# Patient Record
Sex: MALE | Race: WHITE | NOT HISPANIC OR LATINO | Employment: FULL TIME | URBAN - NONMETROPOLITAN AREA
[De-identification: names, ages, dates, MRNs, and addresses within clinical notes are randomized per-mention and may not be internally consistent; named-entity substitution may affect disease eponyms.]

---

## 2020-02-26 ENCOUNTER — HOSPITAL ENCOUNTER (OUTPATIENT)
Dept: ULTRASOUND IMAGING | Facility: HOSPITAL | Age: 70
Discharge: HOME | End: 2020-02-26
Payer: MEDICARE

## 2020-02-26 ENCOUNTER — TRANSCRIBE ORDERS (OUTPATIENT)
Dept: ADMINISTRATIVE | Facility: HOSPITAL | Age: 70
End: 2020-02-26

## 2020-02-26 ENCOUNTER — APPOINTMENT (OUTPATIENT)
Dept: LAB | Facility: HOSPITAL | Age: 70
End: 2020-02-26
Payer: MEDICARE

## 2020-02-26 DIAGNOSIS — E78.5 HYPERLIPIDEMIA, UNSPECIFIED HYPERLIPIDEMIA TYPE: Primary | ICD-10-CM

## 2020-02-26 DIAGNOSIS — Z79.899 ENCOUNTER FOR LONG-TERM (CURRENT) USE OF OTHER MEDICATIONS: ICD-10-CM

## 2020-02-26 DIAGNOSIS — E04.1 NONTOXIC UNINODULAR GOITER: Primary | ICD-10-CM

## 2020-02-26 DIAGNOSIS — E78.5 HYPERLIPIDEMIA, UNSPECIFIED HYPERLIPIDEMIA TYPE: ICD-10-CM

## 2020-02-26 DIAGNOSIS — E04.1 NONTOXIC UNINODULAR GOITER: ICD-10-CM

## 2020-02-26 LAB
ALBUMIN SERPL BCP-MCNC: 3.7 G/DL (ref 3.5–5)
ALP SERPL-CCNC: 42 U/L (ref 46–116)
ALT SERPL W P-5'-P-CCNC: 20 U/L (ref 12–78)
ANION GAP SERPL CALCULATED.3IONS-SCNC: 8 MMOL/L (ref 4–13)
AST SERPL W P-5'-P-CCNC: 19 U/L (ref 5–45)
BILIRUB SERPL-MCNC: 0.9 MG/DL (ref 0.2–1)
BUN SERPL-MCNC: 20 MG/DL (ref 5–25)
CALCIUM SERPL-MCNC: 8.1 MG/DL (ref 8.3–10.1)
CHLORIDE SERPL-SCNC: 105 MMOL/L (ref 100–108)
CHOLEST SERPL-MCNC: 136 MG/DL (ref 50–200)
CO2 SERPL-SCNC: 27 MMOL/L (ref 21–32)
CREAT SERPL-MCNC: 0.93 MG/DL (ref 0.6–1.3)
ERYTHROCYTE [DISTWIDTH] IN BLOOD BY AUTOMATED COUNT: 13.2 % (ref 11.6–15.1)
EST. AVERAGE GLUCOSE BLD GHB EST-MCNC: 103 MG/DL
GFR SERPL CREATININE-BSD FRML MDRD: 83 ML/MIN/1.73SQ M
GLUCOSE P FAST SERPL-MCNC: 99 MG/DL (ref 65–99)
HBA1C MFR BLD: 5.2 %
HCT VFR BLD AUTO: 43.7 % (ref 36.5–49.3)
HDLC SERPL-MCNC: 57 MG/DL
HGB BLD-MCNC: 14.2 G/DL (ref 12–17)
LDLC SERPL CALC-MCNC: 67 MG/DL (ref 0–100)
MCH RBC QN AUTO: 29.3 PG (ref 26.8–34.3)
MCHC RBC AUTO-ENTMCNC: 32.5 G/DL (ref 31.4–37.4)
MCV RBC AUTO: 90 FL (ref 82–98)
NONHDLC SERPL-MCNC: 79 MG/DL
PLATELET # BLD AUTO: 225 THOUSANDS/UL (ref 149–390)
PMV BLD AUTO: 10.3 FL (ref 8.9–12.7)
POTASSIUM SERPL-SCNC: 4 MMOL/L (ref 3.5–5.3)
PROT SERPL-MCNC: 7 G/DL (ref 6.4–8.2)
PSA SERPL-MCNC: 1.3 NG/ML (ref 0–4)
RBC # BLD AUTO: 4.84 MILLION/UL (ref 3.88–5.62)
SODIUM SERPL-SCNC: 140 MMOL/L (ref 136–145)
T4 FREE SERPL-MCNC: 1.14 NG/DL (ref 0.76–1.46)
TRIGL SERPL-MCNC: 61 MG/DL
TSH SERPL DL<=0.05 MIU/L-ACNC: 2.83 UIU/ML (ref 0.36–3.74)
WBC # BLD AUTO: 6.92 THOUSAND/UL (ref 4.31–10.16)

## 2020-02-26 PROCEDURE — 84439 ASSAY OF FREE THYROXINE: CPT

## 2020-02-26 PROCEDURE — 76536 US EXAM OF HEAD AND NECK: CPT

## 2020-02-26 PROCEDURE — 80053 COMPREHEN METABOLIC PANEL: CPT

## 2020-02-26 PROCEDURE — 80061 LIPID PANEL: CPT

## 2020-02-26 PROCEDURE — 84443 ASSAY THYROID STIM HORMONE: CPT

## 2020-02-26 PROCEDURE — G0103 PSA SCREENING: HCPCS

## 2020-02-26 PROCEDURE — 83036 HEMOGLOBIN GLYCOSYLATED A1C: CPT

## 2020-02-26 PROCEDURE — 85027 COMPLETE CBC AUTOMATED: CPT

## 2020-02-26 PROCEDURE — 36415 COLL VENOUS BLD VENIPUNCTURE: CPT

## 2020-07-17 ENCOUNTER — TRANSCRIBE ORDERS (OUTPATIENT)
Dept: ADMINISTRATIVE | Facility: HOSPITAL | Age: 70
End: 2020-07-17

## 2020-07-17 DIAGNOSIS — R51.9 FACIAL PAIN: ICD-10-CM

## 2020-07-17 DIAGNOSIS — R42 DIZZINESS AND GIDDINESS: Primary | ICD-10-CM

## 2020-07-23 ENCOUNTER — HOSPITAL ENCOUNTER (OUTPATIENT)
Dept: CT IMAGING | Facility: HOSPITAL | Age: 70
Discharge: HOME | End: 2020-07-23
Payer: MEDICARE

## 2020-07-23 DIAGNOSIS — R51.9 FACIAL PAIN: ICD-10-CM

## 2020-07-23 DIAGNOSIS — R42 DIZZINESS AND GIDDINESS: ICD-10-CM

## 2020-07-23 PROCEDURE — 70470 CT HEAD/BRAIN W/O & W/DYE: CPT

## 2020-07-23 RX ADMIN — IOHEXOL 85 ML: 350 INJECTION, SOLUTION INTRAVENOUS at 08:29

## 2020-08-04 ENCOUNTER — TRANSCRIBE ORDERS (OUTPATIENT)
Dept: PHYSICAL THERAPY | Facility: CLINIC | Age: 70
End: 2020-08-04

## 2020-08-04 ENCOUNTER — EVALUATION (OUTPATIENT)
Dept: PHYSICAL THERAPY | Facility: CLINIC | Age: 70
End: 2020-08-04
Payer: MEDICARE

## 2020-08-04 DIAGNOSIS — H81.10 BENIGN PAROXYSMAL POSITIONAL VERTIGO, UNSPECIFIED LATERALITY: ICD-10-CM

## 2020-08-04 DIAGNOSIS — R42 VERTIGO: Primary | ICD-10-CM

## 2020-08-04 PROCEDURE — 97535 SELF CARE MNGMENT TRAINING: CPT | Performed by: PHYSICAL THERAPIST

## 2020-08-04 PROCEDURE — 97162 PT EVAL MOD COMPLEX 30 MIN: CPT | Performed by: PHYSICAL THERAPIST

## 2020-08-04 PROCEDURE — 97140 MANUAL THERAPY 1/> REGIONS: CPT | Performed by: PHYSICAL THERAPIST

## 2020-08-04 NOTE — LETTER
2020  PT Evaluation Plan of [address]  [address] [facility]  [address] [address]    Patient: Nilda Ramos   YOB: 1950   Date of Visit: 2020     Encounter Diagnosis     ICD-10-CM    1  Vertigo  R42    2  Benign paroxysmal positional vertigo, unspecified laterality  H81 10      Dear Dr Gamal Kemp:  Thank you for your recent referral of Nilda Ramos  Please review the attached evaluation summary from Bryce's recent visit  Please verify that you agree with the plan of care by signing the attached order  If you have any questions or concerns, please do not hesitate to call  I sincerely appreciate the opportunity to share in the care of one of your patients and hope to have another opportunity to work with you in the near future  Sincerely,    Roel Valdez, PT    Referring Provider:      I certify that I have read the below Plan of Care and certify the need for these services furnished under this plan of treatment while under my care  Jacinto Maguire, [address] [facility]  [address] [address] [address] [facility]: [phone]    Please SIGN ABOVE and return THIS PAGE ONLY to Fax # [phone]        PT Evaluation   Today's date: 2020  Patient name: Nilda Ramos  : 1950  MRN: [id number]  Referring provider: Mercy Shaffer MD  Dx: No diagnosis found  Assessment  Assessment details: Patient's chief Complaint was early morning dizziness when firs getting out of bed  Patient reports he has been feeling better over the past week  He has minimal balance or dizziness issues today  Impairments: activity intolerance, impaired balance and safety issue  Understanding of Dx/Px/POC: excellent  Goals  STG 2-4 weeks:   Increase gait speed   5 ft/sec  Increase B LE strength by 3-8 lbs  Increase standing and walking tolerance to >20 minutes     Patient to navigate 5 steps / stairs with unilateral railing  Reduce C/O dizziness  Decrease pain by 25-50% with standing, walking, and stairs  Increase ROM by 2-5 degrees  Initiate HEP  LTG 8-10 weeks:   Increase gait speed 1 0 ft/sec  Increase B LE strength 15-20 lbs  Increase standing and walking tolerances and distances  Increase static supine flexion to normal    No C/O dizziness  Improve gait pattern and balance  No C/O falls  DC with HEP  Plan  Plan details: All planned modality interventions and planned therapy interventions are provided PRN  Patient would benefit from: PT eval and skilled physical therapy  Planned therapy interventions: neuromuscular re-education, patient education, postural training, self care, strengthening, stretching, therapeutic activities, therapeutic exercise, therapeutic training, transfer training, home exercise program, graded exercise, gait training, flexibility, coordination, balance, balance/weight bearing training and canalith repositioning  Frequency: 3x week  Duration in weeks: 12  Treatment plan discussed with: patient      Subjective Evaluation    Pain  Relieving factors: change in position, relaxation and rest  Aggravating factors: overhead activity and nothing  Progression: improved    Treatments  Current treatment: physical therapy  Patient Goals  Patient goals for therapy: improved balance, increased motion, return to work, return to [employer] activities, independence with ADLs/IADLs and increased strength      Date of onset:  7/10/2020    Date of Surgery:  None    History of Present Episode: 8/4/2020  Marlin Guerrero states no history of accident or injury  Patient states that starting around three week ago he would get up in the morning and he would feel dizzy or unbalanced  Past Medical History:    8/4/2020  Marlin Guerrero reports no history  Previous Level of Functional Ability:  8/4/2020  Marlin Guerrero states he has never had issues with his balance or feeling dizzy in the morning      Inspection / Palpation:  8/4/2020  Mesomorphic body type  No signs of infection  No signs of wounds  No signs of drainage  No signs of ecchymotic regions  No signs of erythremic regions  No signs of muscle spasm  No signs of muscle guarding  No signs of tenderness reported to palpation  No signs of swelling  No signs of a surgery site  Current conditions appears consistent with recent acute episode  Chief Complaints:  8/4/2020  Pilar Adele reports no difficulty with standing  Pilar Adele reports no difficulty with walking  Pilar Adele reports no difficulty with movement / use of his neck region  Pilar Adele reports no difficulty with use of his arms  Pilar Adele reports no difficulty with sleeping  Pilar Adele reports no difficulty with his strength and endurance  Pilar Adele reports no limitations with his neck range of motion  Pilar Adele reports no difficulty lying on his neck region  Pilar Adele reports no difficulty twisting / turning his neck region  Pilar Adele reports mild issues with his balance  Pilar Adele reports mild symptoms with his dizziness  Only occurs when he first gets out of bed  He is good the rest of the day  Janak Advanced Balance Scale (CARLITA): Normal = 40   Date A B C D E F G H I J Total   8/4/2020 4 4 4 4 4 4 4 3 2 4      A )  (0, 1, 2, 3, 4)   Standing with feet together, eyes closed  B )  (0, 1, 2, 3, 4)  Reaching forward to an object (pencil) held at shoulder height with outstretched arm    C )   (0, 1, 2, 3, 4)   Turn 360°  D )  (0, 1, 2, 3, 4)  Tandem walk  E )  (0, 1, 2, 3, 4)  Standing on foam with eyes closed  F )  (0, 1, 2, 3, 4)  Walk with head turned  G )  (0, 1, 2, 3, 4)  Step up and over a 6   H )  (0, 1, 2, 3, 4)  Standing on one leg   I )  (0, 1, 2, 3, 4)  Two-footed jump for distance  J )  (0, 1, 2, 3, 4)  Reactive postural control      NECK PAIN  Resting Palpation Bending  Forward Rotate  Right Rotate  Left   8/4/2020 0 0 0 0 0     NECK PAIN Driving Sleeping Moving Extending Overhead 8/4/2020 0 0 0 0 0     NECK AROM Flexion Extension Rotation Right   8/4/2020 42° 45° 85°     NECK AROM Rotation Left Side Bend Right Side Bend Left   8/4/2020 86° 31° 32°     Neck Screen Compression Distraction Slump Test Epting / Vertigo   8/4/2020 Rt Negative Negative Negative Negative   8/4/2020 Lt Negative Negative Negative Negative     Neck Screen Swallowing Valsalva Adson TMJ   8/4/2020 Rt Negative Negative Negative Negative   8/4/2020 Lt Negative Negative Negative Negative     Neck Screen Referred Pain Thoracic outlet Crepitus   8/4/2020 Rt Negative Negative Negative   8/4/2020 Lt Negative Negative Negative     Precautions:  Vertigo / Early Morning Dizziness    All treatments below will be provided with a focus on neural issues involving balance,   coordination and proprioception plus potential numbness and tingling issues without   ignoring strengthening, flexibility, ROM  Postural, endurance and any possible swelling  and pain which may be present which is also important and necessary to provide full   functional mobility and quality care        Daily Treatment Log  Manual  8/4       MT  ROM 15'       HEP 15'       Neur ExerLog 8/4       Balance Board        P-Bars - Chair squat        P-Bars-GT Forward/Backward/Side - level and dips - eyes open        P-Bars-GT Forward/Backward/Side - level and dips - eyes closed        BOSU-Walk        Foam Pad        Foam Beam        GT - Gym        Fitter-Slalom        Monster Steps        Wall sits        Lunges        Walking with head turned        Walking with ball toss         W/P- Hip Abd/Add/Fl/Ex        T/G-Squats/PF        NuStep        NK Table        TM        Bike        Stepper                Modalities  8/4

## 2020-08-04 NOTE — PROGRESS NOTES
PT Evaluation   Today's date: 2020  Patient name: Inge Ramos  : 1950  MRN: [id number]  Referring provider: Alejandrina Bee MD  Dx: No diagnosis found  Assessment  Assessment details: Patient's chief Complaint was early morning dizziness when firs getting out of bed  Patient reports he has been feeling better over the past week  He has minimal balance or dizziness issues today  Impairments: activity intolerance, impaired balance and safety issue  Understanding of Dx/Px/POC: excellent  Goals  STG 2-4 weeks:   Increase gait speed   5 ft/sec  Increase B LE strength by 3-8 lbs  Increase standing and walking tolerance to >20 minutes  Patient to navigate 5 steps / stairs with unilateral railing  Reduce C/O dizziness  Decrease pain by 25-50% with standing, walking, and stairs  Increase ROM by 2-5 degrees  Initiate HEP  LTG 8-10 weeks:   Increase gait speed 1 0 ft/sec  Increase B LE strength 15-20 lbs  Increase standing and walking tolerances and distances  Increase static supine flexion to normal    No C/O dizziness  Improve gait pattern and balance  No C/O falls  DC with HEP  Plan  Plan details: All planned modality interventions and planned therapy interventions are provided PRN    Patient would benefit from: PT eval and skilled physical therapy  Planned therapy interventions: neuromuscular re-education, patient education, postural training, self care, strengthening, stretching, therapeutic activities, therapeutic exercise, therapeutic training, transfer training, home exercise program, graded exercise, gait training, flexibility, coordination, balance, balance/weight bearing training and canalith repositioning  Frequency: 3x week  Duration in weeks: 12  Treatment plan discussed with: patient      Subjective Evaluation    Pain  Relieving factors: change in position, relaxation and rest  Aggravating factors: overhead activity and nothing  Progression: improved    Treatments  Current treatment: physical therapy  Patient Goals  Patient goals for therapy: improved balance, increased motion, return to work, return to [employer] activities, independence with ADLs/IADLs and increased strength      Date of onset:  7/10/2020    Date of Surgery:  None    History of Present Episode: 8/4/2020  Dalila Arroyo states no history of accident or injury  Patient states that starting around three week ago he would get up in the morning and he would feel dizzy or unbalanced  Past Medical History:    8/4/2020  Dalila Arroyo reports no history  Previous Level of Functional Ability:  8/4/2020  Dalila Arroyo states he has never had issues with his balance or feeling dizzy in the morning  Inspection / Palpation:  8/4/2020  Mesomorphic body type  No signs of infection  No signs of wounds  No signs of drainage  No signs of ecchymotic regions  No signs of erythremic regions  No signs of muscle spasm  No signs of muscle guarding  No signs of tenderness reported to palpation  No signs of swelling  No signs of a surgery site  Current conditions appears consistent with recent acute episode  Chief Complaints:  8/4/2020  Dalila Arroyo reports no difficulty with standing  Dalila Arroyo reports no difficulty with walking  Dalila Arroyo reports no difficulty with movement / use of his neck region  Maxjuan daniel Arroyo reports no difficulty with use of his arms  Maxjuan daniel Arroyo reports no difficulty with sleeping  Dalila Arroyo reports no difficulty with his strength and endurance  Dalila Arroyo reports no limitations with his neck range of motion  Dalila Arroyo reports no difficulty lying on his neck region  Dalila Arroyo reports no difficulty twisting / turning his neck region  Dalila Arroyo reports mild issues with his balance  Dalila Arroyo reports mild symptoms with his dizziness  Only occurs when he first gets out of bed  He is good the rest of the day      Janak Advanced Balance Scale (CARLITA): Normal = 40   Date A B C D E F G H I J Total 8/4/2020 4 4 4 4 4 4 4 3 2 4      A )  (0, 1, 2, 3, 4)   Standing with feet together, eyes closed  B )  (0, 1, 2, 3, 4)  Reaching forward to an object (pencil) held at shoulder height with outstretched arm    C )   (0, 1, 2, 3, 4)   Turn 360°  D )  (0, 1, 2, 3, 4)  Tandem walk  E )  (0, 1, 2, 3, 4)  Standing on foam with eyes closed  F )  (0, 1, 2, 3, 4)  Walk with head turned  G )  (0, 1, 2, 3, 4)  Step up and over a 6   H )  (0, 1, 2, 3, 4)  Standing on one leg   I )  (0, 1, 2, 3, 4)  Two-footed jump for distance  J )  (0, 1, 2, 3, 4)  Reactive postural control  NECK PAIN  Resting Palpation Bending  Forward Rotate  Right Rotate  Left   8/4/2020 0 0 0 0 0     NECK PAIN Driving Sleeping Moving Extending Overhead   8/4/2020 0 0 0 0 0     NECK AROM Flexion Extension Rotation Right   8/4/2020 42° 45° 85°     NECK AROM Rotation Left Side Bend Right Side Bend Left   8/4/2020 86° 31° 32°     Neck Screen Compression Distraction Slump Test Epting / Vertigo   8/4/2020 Rt Negative Negative Negative Negative   8/4/2020 Lt Negative Negative Negative Negative     Neck Screen Swallowing Valsalva Adson TMJ   8/4/2020 Rt Negative Negative Negative Negative   8/4/2020 Lt Negative Negative Negative Negative     Neck Screen Referred Pain Thoracic outlet Crepitus   8/4/2020 Rt Negative Negative Negative   8/4/2020 Lt Negative Negative Negative     Precautions:  Vertigo / Early Morning Dizziness    All treatments below will be provided with a focus on neural issues involving balance,   coordination and proprioception plus potential numbness and tingling issues without   ignoring strengthening, flexibility, ROM  Postural, endurance and any possible swelling  and pain which may be present which is also important and necessary to provide full   functional mobility and quality care        Daily Treatment Log  Manual  8/4       MT  ROM 15'       HEP 15'       Neur ExerLog 8/4       Balance Board        P-Bars - Chair squat P-Bars-GT Forward/Backward/Side - level and dips - eyes open        P-Bars-GT Forward/Backward/Side - level and dips - eyes closed        BOSU-Walk        Foam Pad        Foam Beam        GT - Gym        Fitter-Slalom        Monster Steps        Wall sits        Lunges        Walking with head turned        Walking with ball toss         W/P- Hip Abd/Add/Fl/Ex        T/G-Squats/PF        NuStep        NK Table        TM        Bike        Stepper                Modalities  8/4

## 2020-09-09 NOTE — PROGRESS NOTES
PT Discharge  Today's date: 2020  Patient name: Montse Ramos  : 1950  MRN: [id number]  Referring provider: Khoa Moraes MD  Dx:   Encounter Diagnosis     ICD-10-CM    1  Vertigo  R42    2  Benign paroxysmal positional vertigo, unspecified laterality  H81 10      2020  Carlos Ramos has not returned for more treatment  Montse Ramos did not attend today's appointment  I cannot provide you with a current progress report but I can provide you with information based on previous performance  Montse Ramos is discharged at this time

## 2021-03-09 DIAGNOSIS — Z23 ENCOUNTER FOR IMMUNIZATION: ICD-10-CM

## 2022-04-25 ENCOUNTER — OFFICE VISIT (OUTPATIENT)
Dept: URGENT CARE | Facility: CLINIC | Age: 72
End: 2022-04-25
Payer: MEDICARE

## 2022-04-25 VITALS
SYSTOLIC BLOOD PRESSURE: 129 MMHG | BODY MASS INDEX: 30.3 KG/M2 | WEIGHT: 171 LBS | HEIGHT: 63 IN | DIASTOLIC BLOOD PRESSURE: 67 MMHG | OXYGEN SATURATION: 98 % | HEART RATE: 61 BPM | RESPIRATION RATE: 20 BRPM | TEMPERATURE: 97.5 F

## 2022-04-25 DIAGNOSIS — G57.61 MORTON'S NEUROMA OF RIGHT FOOT: ICD-10-CM

## 2022-04-25 DIAGNOSIS — L03.031 PARONYCHIA OF THIRD TOE OF RIGHT FOOT: Primary | ICD-10-CM

## 2022-04-25 PROCEDURE — 99203 OFFICE O/P NEW LOW 30 MIN: CPT | Performed by: EMERGENCY MEDICINE

## 2022-04-25 PROCEDURE — G0463 HOSPITAL OUTPT CLINIC VISIT: HCPCS | Performed by: EMERGENCY MEDICINE

## 2022-04-25 RX ORDER — ATORVASTATIN CALCIUM 10 MG/1
TABLET, FILM COATED ORAL
COMMUNITY
Start: 2022-04-14

## 2022-04-25 RX ORDER — DOXYCYCLINE 100 MG/1
100 TABLET ORAL 2 TIMES DAILY
Qty: 14 TABLET | Refills: 0 | Status: SHIPPED | OUTPATIENT
Start: 2022-04-25 | End: 2022-05-02

## 2022-04-25 NOTE — PROGRESS NOTES
[address] [facility]        NAME: Gulshan Ramos is a 70 y o  male  : 1950    MRN: [id number]  DATE: 2022  TIME: 9:53 AM    Assessment and Plan   Paronychia of third toe of right foot [[id number]]  1  Paronychia of third toe of right foot  Ambulatory Referral to Podiatry    doxycycline (ADOXA) 100 MG tablet   2  Vargas's neuroma of right foot  Ambulatory Referral to Podiatry    doxycycline (ADOXA) 100 MG tablet         Patient Instructions     Patient Instructions     Paronychia   WHAT YOU NEED TO KNOW:   Paronychia is an infection of your nail fold caused by bacteria or a fungus  The nail fold is the skin around your nail  Paronychia may happen suddenly and last for 6 weeks or longer  You may have paronychia on more than 1 finger or toe  DISCHARGE INSTRUCTIONS:   Medicines:   · Td vaccine  is a booster shot used to help prevent tetanus and diphtheria  The Td booster may be given to adolescents and adults every 10 years or for certain wounds and injuries  · Antibiotics: This medicine will help fight or prevent an infection  It may be given as a pill, cream, or ointment  · Steroids: This medicine will help decrease inflammation  It may be given as a pill, cream, or ointment  · Antifungal medicine: This medicine helps kill fungus that may be causing your infection  It may be given as a cream or ointment  · NSAIDs:  These medicines decrease pain and swelling  NSAIDs are available without a doctor's order  Ask your healthcare provider which medicine is right for you  Ask how much to take and when to take it  Take as directed  NSAIDs can cause stomach bleeding and kidney problems if not taken correctly  · Take your medicine as directed  Contact your healthcare provider if you think your medicine is not helping or if you have side effects  Tell him of her if you are allergic to any medicine  Keep a list of the medicines, vitamins, and herbs you take   Include the amounts, and when and why you take them  Bring the list or the pill bottles to follow-up visits  Carry your medicine list with you in case of an emergency  Follow up with your doctor as directed:  Write down your questions so you remember to ask them during your visits  Self-care:   · Soak your nail:  Soak your nail in a mixture of equal parts vinegar and water 3 or 4 times each day  This will help decrease inflammation  · Apply a warm compress:  Soak a washcloth in warm water and place it on your nail  This will help decrease inflammation  · Elevate:  Raise your nail above the level of your heart as often as you can  This will help decrease swelling and pain  Prop your nail on pillows or blankets to keep it elevated comfortably  · Use lotion:  Apply lotion after you wash your hands  This will prevent your skin from becoming too dry  Prevent paronychia:   · Avoid chemicals and allergens that may harm your skin and nails  This includes soaps, laundry detergents, and nail products  · Keep your nails clean and dry  Avoid soaking your nails in water  Use cotton-lined rubber gloves or wear 2 rubber gloves if you work with food or water  The gloves will help protect your nail folds  · Keep your nails short  Do not bite your nails, pick at your hangnails, suck your fingers, or wear fake nails  Bring your own nail tools when you go to the nail salon  Contact your healthcare provider if:   · Your nail becomes loose, deformed, or falls off  · You have a large abscess on your nail  · You have questions or concerns about your condition or care  Return to the emergency department if:   · You have severe nail pain  · The inflammation spreads to your hand or arm  © Copyright [employer] 2022 Information is for End User's use only and may not be sold, redistributed or otherwise used for commercial purposes   All illustrations and images included in CareNotes® are the copyrighted property of [employer]  or [address]   The above information is an  only  It is not intended as medical advice for individual conditions or treatments  Talk to your doctor, nurse or pharmacist before following any medical regimen to see if it is safe and effective for you  Vargas Neuroma   WHAT YOU NEED TO KNOW:   Vargas neuroma is inflammation of one of the nerves in your foot  It usually occurs in the ball of your foot, between your third and fourth toes  DISCHARGE INSTRUCTIONS:   Contact your healthcare provider if:   · Your symptoms spread to your toes  · Your symptoms do not improve after treatment  · You have questions or concerns about your condition or care  Medicines: You may need any of the following:  · NSAIDs  help decrease swelling and pain or fever  This medicine is available with or without a doctor's order  NSAIDs can cause stomach bleeding or kidney problems in certain people  If you take blood thinner medicine, always ask your healthcare provider if NSAIDs are safe for you  Always read the medicine label and follow directions  · Take your medicine as directed  Contact your healthcare provider if you think your medicine is not helping or if you have side effects  Tell him or her if you are allergic to any medicine  Keep a list of the medicines, vitamins, and herbs you take  Include the amounts, and when and why you take them  Bring the list or the pill bottles to follow-up visits  Carry your medicine list with you in case of an emergency  Wear flat shoes with a wide toe box: This will decrease the pressure on the front of your foot  Wear orthotics, arch supports, or foot pads: These help relieve pressure and cushion the ball of your foot  You may need a medical shoe insert ordered by your healthcare provider  Do an ice massage to decrease pain and swelling:  Freeze a paper or foam cup filled with water and roll it under your foot  Do this for 20 minutes, 2 times each day  Follow up with your healthcare provider as directed:  Write down your questions so you remember to ask them during your visits  © Copyright [employer] 2022 Information is for End User's use only and may not be sold, redistributed or otherwise used for commercial purposes  All illustrations and images included in CareNotes® are the copyrighted property of [employer]  or [address] Stoddard   The above information is an  only  It is not intended as medical advice for individual conditions or treatments  Talk to your doctor, nurse or pharmacist before following any medical regimen to see if it is safe and effective for you  Doxycycline (By mouth)   Doxycycline ([id number])  Treats and prevents infections  Also used to prevent malaria and treat rosacea or severe acne  This medicine is a tetracycline antibiotic  Brand Name(s): Acticlate, Adoxa, Adoxa Tom 1/150, Avidoxy, Doryx, Doryx MPC, LymePak, Mondoxyne NL, Monodox, Morgidox [id number], Morgidox [id number], Oracea, Targadox, Vibramycin Calcium, Vibramycin Hyclate   There may be other brand names for this medicine  When This Medicine Should Not Be Used: This medicine is not right for everyone  Do not use it if you had an allergic reaction to doxycycline or another tetracycline antibiotic, or if you are pregnant or breastfeeding  How to Use This Medicine:   Capsule, Delayed Release Capsule, Long Acting Capsule, Liquid, Tablet, Delayed Release Tablet  · Your doctor will tell you how much medicine to use  Do not use more than directed  · Ask your pharmacist or doctor if you need to take this medicine with or without food  Some forms can be taken with food or milk, but others must be taken on an empty stomach  · Capsule: Swallow whole  Do not break, crush, chew, or open it  ? Oracea® capsules: This medicine must be taken on an empty stomach, at least 1 hour before or 2 hours after a meal   ? Acticlate® Cap capsules:  You may take this medicine with food or milk to avoid stomach irritation  · Delayed-release capsules: You may also take this medicine by sprinkling the open capsules onto cold, soft applesauce  Do not lose any pellets when transferring the contents  Swallow this mixture right away  Do not chew it  Do not store the mixture for later use  You may take this medicine with food or milk to avoid stomach upset  · Delayed-release tablets: You may also take this medicine by sprinkling the broken tablets onto room-temperature applesauce  Swallow this mixture right away  Do not chew it  Do not store the mixture for later use  You may take this medicine with food or milk to avoid stomach upset  · Oral liquid: Shake the bottle well just before each use  Measure the oral liquid medicine with a marked measuring spoon, oral syringe, or medicine cup  · Tablets: You may take this medicine with food or milk to avoid stomach irritation  To break a tablet, hold the tablet between your thumb and index fingers close to the appropriate scored line  Then, apply enough pressure to snap the tablet segments apart  Do not use the tablet if it does not break on the scored lines  · Take all of the medicine in your prescription to clear up your infection, even if you feel better after the first few doses  · Drink plenty of fluids to avoid throat problems, if you take the capsule or tablet form  · Malaria prevention: Start taking the medicine 1 or 2 days before you travel  Take the medicine every day during your trip  Keep taking it for 4 weeks after you return  However, do not use the medicine for longer than 4 months  · Do not use this medicine for more than 9 months if you are using it for rosacea  · Use only the brand of medicine your doctor prescribed  Other brands may not work the same way  · Read and follow the patient instructions that come with this medicine  Talk to your doctor or pharmacist if you have any questions  · Missed dose:  Take a dose as soon as you remember  If it is almost time for your next dose, wait until then and take a regular dose  Do not take extra medicine to make up for a missed dose  · Store the medicine in a closed container at room temperature, away from heat, moisture, and direct light  Do not freeze the oral liquid  Drugs and Foods to Avoid:   Ask your doctor or pharmacist before using any other medicine, including over-the-counter medicines, vitamins, and herbal products  · Some medicines can affect how doxycycline works  Tell your doctor if you are using any of the following:  ? Bismuth subsalicylate  ? Acne medicines (including isotretinoin)  ? Birth control pills  ? Blood thinner (including warfarin)  ? Medicine for seizures (including carbamazepine, phenobarbital, phenytoin)  ? Medicine that contains aluminum, calcium, magnesium, or iron (including an antacid or vitamin supplement)  ? Medicine to treat psoriasis (including acitretin)  ? Penicillin antibiotic  ? Stomach medicine  Warnings While Using This Medicine:   · This medicine may cause birth defects if either partner is using it during conception or pregnancy  Tell your doctor right away if you or your partner becomes pregnant  Birth control pills may not work as well when used together with this medicine  Use a second form of birth control to keep from getting pregnant  · Tell your doctor if you have kidney disease, liver disease, asthma, or an allergy to sulfites  Tell your doctor if you had surgery on your stomach, or if you have a history of yeast infections  · This medicine may cause the following problems:  ? Permanent change in tooth color (in children younger than 6years of age)  ? Serious skin reactions, including drug reaction with eosinophilia and systemic symptoms (DRESS)  ? Increased pressure inside the head  ? Yeast infection  ? Immune system problems  · This medicine can cause diarrhea   Call your doctor if the diarrhea becomes severe, does not stop, or is bloody  Do not take any medicine to stop diarrhea until you have talked to your doctor  Diarrhea can occur 2 months or more after you stop taking this medicine  · This medicine may make your skin more sensitive to sunlight  Wear sunscreen  Do not use sunlamps or tanning beds  · Tell any doctor or dentist who treats you that you are using this medicine  This medicine may affect certain medical test results  · Call your doctor if your symptoms do not improve or if they get worse  · Your doctor will do lab tests at regular visits to check on the effects of this medicine  Keep all appointments  · Keep all medicine out of the reach of children  Never share your medicine with anyone  Possible Side Effects While Using This Medicine:   Call your doctor right away if you notice any of these side effects:  · Allergic reaction: Itching or hives, swelling in your face or hands, swelling or tingling in your mouth or throat, chest tightness, trouble breathing  · Blistering, peeling, red skin rash  · Burning, pain, or irritation in your upper stomach or throat  · Diarrhea that may contain blood  · Fever, chills, cough, runny or stuffy nose, sore throat, body aches  · Joint pain, unusual tiredness or weakness  · Severe headache, dizziness, vision changes  · Sudden and severe stomach pain, nausea, vomiting, lightheadedness  · Swollen, painful, or tender lymph glands in the neck, armpit, or groin, or yellow skin or eyes  If you notice these less serious side effects, talk with your doctor:   · Darkening of your skin, scars, teeth, or gums  · Sores or white patches on your lips, mouth, or throat  If you notice other side effects that you think are caused by this medicine, tell your doctor  Call your doctor for medical advice about side effects   You may report side effects to FDA at 4-756-FDA-6749    © Copyright [employer] 2022 Information is for End User's use only and may not be sold, redistributed or otherwise used for commercial purposes  The above information is an  only  It is not intended as medical advice for individual conditions or treatments  Talk to your doctor, nurse or pharmacist before following any medical regimen to see if it is safe and effective for you  Follow up with PCP in 3-5 days  Proceed to  ER if symptoms worsen  Chief Complaint     Chief Complaint   Patient presents with    Foot Pain     Patient states he started with discomfort in his R midle toe, felt like he was walking on a marble  His R foot is swollen and red  History of Present Illness       Patient complains of increased redness, swelling and pain medial nail margin right 3rd toe for the past 2 days  He also notes some redness and swelling of the entire right foot today  He denies similar symptoms in the past   He also has a sensation as if he is walking on a marbleand a spot  Review of Systems   Review of Systems   Constitutional: Negative for chills and fever  Respiratory: Negative for shortness of breath  Cardiovascular: Negative for chest pain  Skin: Positive for color change  Negative for wound  Current Medications       Current Outpatient Medications:     atorvastatin (LIPITOR) 10 mg tablet, , Disp: , Rfl:     doxycycline (ADOXA) 100 MG tablet, Take 1 tablet (100 mg total) by mouth 2 (two) times a day for 7 days, Disp: 14 tablet, Rfl: 0    Current Allergies     Allergies as of 04/25/2022 - Reviewed 04/25/2022   Allergen Reaction Noted    Penicillins  08/04/2020    Tetanus toxoids  08/04/2020            The following portions of the patient's history were reviewed and updated as appropriate: allergies, current medications, past family history, past medical history, past social history, past surgical history and problem list      Past Medical History:   Diagnosis Date    Hypercholesteremia        History reviewed  No pertinent surgical history  History reviewed   No pertinent family history  Medications have been verified  Objective   /67   Pulse 61   Temp 97 5 °F (36 4 °C)   Resp 20   Ht 5' 3" (1 6 m)   Wt 77 6 kg (171 lb)   SpO2 98%   BMI 30 29 kg/m²        Physical Exam     Physical Exam  Vitals and nursing note reviewed  Constitutional:       General: He is not in acute distress  Appearance: He is well-developed  Musculoskeletal:         General: No tenderness  Cervical back: Neck supple  Skin:     General: Skin is warm and dry  Findings: Erythema present  No rash  Comments: Mild erythema, mild swelling, mild tenderness medial nail margin right 3rd toe, no I and D able lesion  Mild erythema minimal swelling right foot dorsum   Neurological:      Mental Status: He is alert and oriented to person, place, and time  Psychiatric:         Mood and Affect: Mood normal          Behavior: Behavior normal          Thought Content:  Thought content normal          Judgment: Judgment normal

## 2022-04-25 NOTE — PATIENT INSTRUCTIONS
Paronychia   WHAT YOU NEED TO KNOW:   Paronychia is an infection of your nail fold caused by bacteria or a fungus  The nail fold is the skin around your nail  Paronychia may happen suddenly and last for 6 weeks or longer  You may have paronychia on more than 1 finger or toe  DISCHARGE INSTRUCTIONS:   Medicines:   · Td vaccine  is a booster shot used to help prevent tetanus and diphtheria  The Td booster may be given to adolescents and adults every 10 years or for certain wounds and injuries  · Antibiotics: This medicine will help fight or prevent an infection  It may be given as a pill, cream, or ointment  · Steroids: This medicine will help decrease inflammation  It may be given as a pill, cream, or ointment  · Antifungal medicine: This medicine helps kill fungus that may be causing your infection  It may be given as a cream or ointment  · NSAIDs:  These medicines decrease pain and swelling  NSAIDs are available without a doctor's order  Ask your healthcare provider which medicine is right for you  Ask how much to take and when to take it  Take as directed  NSAIDs can cause stomach bleeding and kidney problems if not taken correctly  · Take your medicine as directed  Contact your healthcare provider if you think your medicine is not helping or if you have side effects  Tell him of her if you are allergic to any medicine  Keep a list of the medicines, vitamins, and herbs you take  Include the amounts, and when and why you take them  Bring the list or the pill bottles to follow-up visits  Carry your medicine list with you in case of an emergency  Follow up with your doctor as directed:  Write down your questions so you remember to ask them during your visits  Self-care:   · Soak your nail:  Soak your nail in a mixture of equal parts vinegar and water 3 or 4 times each day  This will help decrease inflammation      · Apply a warm compress:  Soak a washcloth in warm water and place it on your nail  This will help decrease inflammation  · Elevate:  Raise your nail above the level of your heart as often as you can  This will help decrease swelling and pain  Prop your nail on pillows or blankets to keep it elevated comfortably  · Use lotion:  Apply lotion after you wash your hands  This will prevent your skin from becoming too dry  Prevent paronychia:   · Avoid chemicals and allergens that may harm your skin and nails  This includes soaps, laundry detergents, and nail products  · Keep your nails clean and dry  Avoid soaking your nails in water  Use cotton-lined rubber gloves or wear 2 rubber gloves if you work with food or water  The gloves will help protect your nail folds  · Keep your nails short  Do not bite your nails, pick at your hangnails, suck your fingers, or wear fake nails  Bring your own nail tools when you go to the nail salon  Contact your healthcare provider if:   · Your nail becomes loose, deformed, or falls off  · You have a large abscess on your nail  · You have questions or concerns about your condition or care  Return to the emergency department if:   · You have severe nail pain  · The inflammation spreads to your hand or arm  © Copyright [employer] 2022 Information is for End User's use only and may not be sold, redistributed or otherwise used for commercial purposes  All illustrations and images included in CareNotes® are the copyrighted property of [employer]  or Tim Wright  The above information is an  only  It is not intended as medical advice for individual conditions or treatments  Talk to your doctor, nurse or pharmacist before following any medical regimen to see if it is safe and effective for you  Vargas Neuroma   WHAT YOU NEED TO KNOW:   Vargas neuroma is inflammation of one of the nerves in your foot  It usually occurs in the ball of your foot, between your third and fourth toes         DISCHARGE INSTRUCTIONS: Contact your healthcare provider if:   · Your symptoms spread to your toes  · Your symptoms do not improve after treatment  · You have questions or concerns about your condition or care  Medicines: You may need any of the following:  · NSAIDs  help decrease swelling and pain or fever  This medicine is available with or without a doctor's order  NSAIDs can cause stomach bleeding or kidney problems in certain people  If you take blood thinner medicine, always ask your healthcare provider if NSAIDs are safe for you  Always read the medicine label and follow directions  · Take your medicine as directed  Contact your healthcare provider if you think your medicine is not helping or if you have side effects  Tell him or her if you are allergic to any medicine  Keep a list of the medicines, vitamins, and herbs you take  Include the amounts, and when and why you take them  Bring the list or the pill bottles to follow-up visits  Carry your medicine list with you in case of an emergency  Wear flat shoes with a wide toe box: This will decrease the pressure on the front of your foot  Wear orthotics, arch supports, or foot pads: These help relieve pressure and cushion the ball of your foot  You may need a medical shoe insert ordered by your healthcare provider  Do an ice massage to decrease pain and swelling:  Freeze a paper or foam cup filled with water and roll it under your foot  Do this for 20 minutes, 2 times each day  Follow up with your healthcare provider as directed:  Write down your questions so you remember to ask them during your visits  © Copyright [employer] 2022 Information is for End User's use only and may not be sold, redistributed or otherwise used for commercial purposes  All illustrations and images included in CareNotes® are the copyrighted property of [employer]  or Tim Wright  The above information is an  only   It is not intended as medical advice for individual conditions or treatments  Talk to your doctor, nurse or pharmacist before following any medical regimen to see if it is safe and effective for you  Doxycycline (By mouth)   Doxycycline ([id number])  Treats and prevents infections  Also used to prevent malaria and treat rosacea or severe acne  This medicine is a tetracycline antibiotic  Brand Name(s): Acticlate, Adoxa, Adoxa Tom 1/150, Avidoxy, Doryx, Doryx MPC, LymePak, Mondoxyne NL, Monodox, Morgidox [id number], Morgidox [id number], Oracea, Targadox, Vibramycin Calcium, Vibramycin Hyclate   There may be other brand names for this medicine  When This Medicine Should Not Be Used: This medicine is not right for everyone  Do not use it if you had an allergic reaction to doxycycline or another tetracycline antibiotic, or if you are pregnant or breastfeeding  How to Use This Medicine:   Capsule, Delayed Release Capsule, Long Acting Capsule, Liquid, Tablet, Delayed Release Tablet  · Your doctor will tell you how much medicine to use  Do not use more than directed  · Ask your pharmacist or doctor if you need to take this medicine with or without food  Some forms can be taken with food or milk, but others must be taken on an empty stomach  · Capsule: Swallow whole  Do not break, crush, chew, or open it  ? Oracea® capsules: This medicine must be taken on an empty stomach, at least 1 hour before or 2 hours after a meal   ? Acticlate® Cap capsules: You may take this medicine with food or milk to avoid stomach irritation  · Delayed-release capsules: You may also take this medicine by sprinkling the open capsules onto cold, soft applesauce  Do not lose any pellets when transferring the contents  Swallow this mixture right away  Do not chew it  Do not store the mixture for later use  You may take this medicine with food or milk to avoid stomach upset  · Delayed-release tablets:  You may also take this medicine by sprinkling the broken tablets onto room-temperature applesauce  Swallow this mixture right away  Do not chew it  Do not store the mixture for later use  You may take this medicine with food or milk to avoid stomach upset  · Oral liquid: Shake the bottle well just before each use  Measure the oral liquid medicine with a marked measuring spoon, oral syringe, or medicine cup  · Tablets: You may take this medicine with food or milk to avoid stomach irritation  To break a tablet, hold the tablet between your thumb and index fingers close to the appropriate scored line  Then, apply enough pressure to snap the tablet segments apart  Do not use the tablet if it does not break on the scored lines  · Take all of the medicine in your prescription to clear up your infection, even if you feel better after the first few doses  · Drink plenty of fluids to avoid throat problems, if you take the capsule or tablet form  · Malaria prevention: Start taking the medicine 1 or 2 days before you travel  Take the medicine every day during your trip  Keep taking it for 4 weeks after you return  However, do not use the medicine for longer than 4 months  · Do not use this medicine for more than 9 months if you are using it for rosacea  · Use only the brand of medicine your doctor prescribed  Other brands may not work the same way  · Read and follow the patient instructions that come with this medicine  Talk to your doctor or pharmacist if you have any questions  · Missed dose: Take a dose as soon as you remember  If it is almost time for your next dose, wait until then and take a regular dose  Do not take extra medicine to make up for a missed dose  · Store the medicine in a closed container at room temperature, away from heat, moisture, and direct light  Do not freeze the oral liquid  Drugs and Foods to Avoid:   Ask your doctor or pharmacist before using any other medicine, including over-the-counter medicines, vitamins, and herbal products    · Some medicines can affect how doxycycline works  Tell your doctor if you are using any of the following:  ? Bismuth subsalicylate  ? Acne medicines (including isotretinoin)  ? Birth control pills  ? Blood thinner (including warfarin)  ? Medicine for seizures (including carbamazepine, phenobarbital, phenytoin)  ? Medicine that contains aluminum, calcium, magnesium, or iron (including an antacid or vitamin supplement)  ? Medicine to treat psoriasis (including acitretin)  ? Penicillin antibiotic  ? Stomach medicine  Warnings While Using This Medicine:   · This medicine may cause birth defects if either partner is using it during conception or pregnancy  Tell your doctor right away if you or your partner becomes pregnant  Birth control pills may not work as well when used together with this medicine  Use a second form of birth control to keep from getting pregnant  · Tell your doctor if you have kidney disease, liver disease, asthma, or an allergy to sulfites  Tell your doctor if you had surgery on your stomach, or if you have a history of yeast infections  · This medicine may cause the following problems:  ? Permanent change in tooth color (in children younger than 6years of age)  ? Serious skin reactions, including drug reaction with eosinophilia and systemic symptoms (DRESS)  ? Increased pressure inside the head  ? Yeast infection  ? Immune system problems  · This medicine can cause diarrhea  Call your doctor if the diarrhea becomes severe, does not stop, or is bloody  Do not take any medicine to stop diarrhea until you have talked to your doctor  Diarrhea can occur 2 months or more after you stop taking this medicine  · This medicine may make your skin more sensitive to sunlight  Wear sunscreen  Do not use sunlamps or tanning beds  · Tell any doctor or dentist who treats you that you are using this medicine  This medicine may affect certain medical test results    · Call your doctor if your symptoms do not improve or if they get worse   · Your doctor will do lab tests at regular visits to check on the effects of this medicine  Keep all appointments  · Keep all medicine out of the reach of children  Never share your medicine with anyone  Possible Side Effects While Using This Medicine:   Call your doctor right away if you notice any of these side effects:  · Allergic reaction: Itching or hives, swelling in your face or hands, swelling or tingling in your mouth or throat, chest tightness, trouble breathing  · Blistering, peeling, red skin rash  · Burning, pain, or irritation in your upper stomach or throat  · Diarrhea that may contain blood  · Fever, chills, cough, runny or stuffy nose, sore throat, body aches  · Joint pain, unusual tiredness or weakness  · Severe headache, dizziness, vision changes  · Sudden and severe stomach pain, nausea, vomiting, lightheadedness  · Swollen, painful, or tender lymph glands in the neck, armpit, or groin, or yellow skin or eyes  If you notice these less serious side effects, talk with your doctor:   · Darkening of your skin, scars, teeth, or gums  · Sores or white patches on your lips, mouth, or throat  If you notice other side effects that you think are caused by this medicine, tell your doctor  Call your doctor for medical advice about side effects  You may report side effects to FDA at 3-499-FDA-4315    © Copyright [address] 2022 Information is for End User's use only and may not be sold, redistributed or otherwise used for commercial purposes  The above information is an  only  It is not intended as medical advice for individual conditions or treatments  Talk to your doctor, nurse or pharmacist before following any medical regimen to see if it is safe and effective for you

## 2023-02-14 ENCOUNTER — EVALUATION (OUTPATIENT)
Dept: PHYSICAL THERAPY | Facility: CLINIC | Age: 73
End: 2023-02-14

## 2023-02-14 DIAGNOSIS — M54.40 LOW BACK PAIN WITH SCIATICA, SCIATICA LATERALITY UNSPECIFIED, UNSPECIFIED BACK PAIN LATERALITY, UNSPECIFIED CHRONICITY: Primary | ICD-10-CM

## 2023-02-14 NOTE — PROGRESS NOTES
PT Evaluation     Today's date: 2023  Patient name: Sandra Ramos  : 1950  MRN: [id number]  Referring provider: Dontae Talbert DO  Dx:   Encounter Diagnosis     ICD-10-CM    1  Low back pain with sciatica, sciatica laterality unspecified, unspecified back pain laterality, unspecified chronicity  M54 40                      Assessment  Assessment details: Pt is a 67year old male who presents to OP PT for low back pain with sciatica that alternates lower extremities  Upon examination, patient presents with (+) neural tension tests, (+) response to flexion based activities and hypmobility of lumbar spine  Due to his current impairments patient has difficulty with prolonged standing, walking and stairs  Pt would benefit from OP PT services in order to address current impairments and functional limitations  Thank you for your referral!    Impairments: abnormal or restricted ROM, activity intolerance, impaired balance, impaired physical strength, lacks appropriate home exercise program and pain with function    Goals  STG (to be met within 4 weeks):  1  Pt will reports no more than 3/10 pain at worst in order to improve function    2  Pt will improve lumbar ROM to next least restrictive motion in order to improve lumbar mobility  3  Pt will be able to tolerate standing for at least 15 minutes in order to complete basic ADLs  4  Pt will improve lumbar PA mobility to pain free WFL in order to improve posture  5  Pt will be able to ambulate around grocery store without increase in pain in order to improve function  6  Pt will improve FOTO score by at least 10 points in order to improve QOL    LTG (to be met in 8 weeks):  1  Pt will report no more than 0/10 pain at worst in order to complete ADLs  2  Pt will be able to stand for self selected periods of time in order to improve function  3  Pt will be able to ambulate self selected distances in order to meet personal goals  4   Pt will to meet FOTO discharge score in order to improve QOL  5  Pt to be independent with HEP       Plan  Patient would benefit from: skilled physical therapy  Planned modality interventions: thermotherapy: hydrocollator packs and cryotherapy  Planned therapy interventions: joint mobilization, manual therapy, neuromuscular re-education, patient education, strengthening, stretching, therapeutic exercise, home exercise program and balance  Frequency: 2x week  Duration in weeks: 6  Treatment plan discussed with: patient        Subjective Evaluation    History of Present Illness  Mechanism of injury: Pt reports the start of low back and alternating leg pain over the past few months  Over the last few weeks his symptoms have gotten more intense, espeically with in his legs  His symptoms are worsened with standing or walking and relieved with sitting  He is currently not taking any rx medications for his back pain; currently on statins  Pain  Current pain ratin  At best pain ratin  At worst pain ratin  Location: L side low back, posterior L leg  Aggravating factors: standing, walking and stair climbing    Treatments  Current treatment: medication and physical therapy  Patient Goals  Patient goals for therapy: increased strength, independence with ADLs/IADLs, return to sport/leisure activities, increased motion, improved balance and decreased pain          Objective     Concurrent Complaints  Negative for night pain, disturbed sleep, bladder dysfunction, bowel dysfunction and saddle (S4) numbness    Tenderness     Lumbar Spine  Tenderness in the spinous process and facet joint  Left Hip   Tenderness in the PSIS  Right Hip   No tenderness in the PSIS       Neurological Testing     Sensation     Lumbar   Left   Intact: light touch    Right   Intact: light touch    Reflexes   Left   Patellar (L4): trace (1+)  Achilles (S1): trace (1+)    Right   Patellar (L4): trace (1+)  Achilles (S1): trace (1+)    Active Range of Motion     Lumbar Flexion:  with pain Restriction level: minimal  Extension:  with pain Restriction level: moderate  Left lateral flexion:  Restriction level: minimal  Right lateral flexion:  Restriction level: moderate  Left rotation:  [facility]  Right rotation:  [facility]  Mechanical Assessment    Cervical      Thoracic      Lumbar    Standing flexion: repeated movements   Pain location:no change  Standing extension: repeated movements  Pain location: peripheralized  Pain intensity: worse    Strength/Myotome Testing     Lumbar   Left   Normal strength    Right   Normal strength    Tests     Lumbar     Left   Positive tibial bias  Negative passive SLR and sural bias  Right   Positive sural bias and tibial bias  Negative crossed SLR  Left Knee   Negative peroneal nerve tension  Right Knee   Negative peroneal nerve tension         Flowsheet Rows    Flowsheet Row Most Recent Value   PT/OT G-Codes    Current Score 54   Projected Score 65             Precautions:     Manuals 2/14       LE HS, quad, piriformis, hip ABD, and gastroc         Lumbar PA mobility        Long axis distraction        STM                                        Neuro Re-Ed        Stand OAL        Palloff press        Pball roll knees to chest                TherEx        Bike        PPT        LTR        Bridges        Clamshells        Lunges        Leg press DL, SL                Instructed HEP & education 10'                       Modalities         MHP/CP PRN

## 2023-02-15 NOTE — PROGRESS NOTES
Daily Note     Today's date: 2023  Patient name: Arnie Ramos  : 1950  MRN: [id number]  Referring provider: Robson Mohamud DO  Dx:   Encounter Diagnosis     ICD-10-CM    1  Low back pain with sciatica, sciatica laterality unspecified, unspecified back pain laterality, unspecified chronicity  M54 40                      Subjective:  Patient reports continuation of posterior LE symptoms with any prolonged standing and walking activities  Objective: See treatment diary below      Assessment: Tolerated treatment well  PT notes start of POC with focus on lumbar stabilization and manual therapy to decrease pain levels and improve functional limitations to meet therapy goals  PT notes continuation of decrease ROM and strength t/o the lumbar spine with trunk/core weakness with need for continuation of skilled therapy  Plan: Continue per plan of care        Precautions:     Manuals       LE HS, quad, piriformis, hip ABD, and gastroc   10 min       Lumbar PA mobility  3 min       Long axis distraction  2 min       STM                                        Neuro Re-Ed        Stand OAL  10x Bilat       Palloff press  10x5" Hold Bilat Blue       Pball roll knees to chest  10x5" Hold   3 way               TherEx        Bike  10 min L3       PPT  10x3" Hold       LTR  With Tball   10x5" Hold       Bridges  2x10       Clamshells        Lunges  2x10 Each   Bilat F/L       Leg press DL, SL                Instructed HEP & education 10'                       Modalities         MHP/CP PRN  15 min MHP LB Seated

## 2023-02-16 ENCOUNTER — OFFICE VISIT (OUTPATIENT)
Dept: PHYSICAL THERAPY | Facility: CLINIC | Age: 73
End: 2023-02-16

## 2023-02-16 DIAGNOSIS — M54.40 LOW BACK PAIN WITH SCIATICA, SCIATICA LATERALITY UNSPECIFIED, UNSPECIFIED BACK PAIN LATERALITY, UNSPECIFIED CHRONICITY: Primary | ICD-10-CM

## 2023-02-21 ENCOUNTER — APPOINTMENT (OUTPATIENT)
Dept: PHYSICAL THERAPY | Facility: CLINIC | Age: 73
End: 2023-02-21

## 2023-02-22 ENCOUNTER — OFFICE VISIT (OUTPATIENT)
Dept: PHYSICAL THERAPY | Facility: CLINIC | Age: 73
End: 2023-02-22

## 2023-02-22 DIAGNOSIS — M54.40 LOW BACK PAIN WITH SCIATICA, SCIATICA LATERALITY UNSPECIFIED, UNSPECIFIED BACK PAIN LATERALITY, UNSPECIFIED CHRONICITY: Primary | ICD-10-CM

## 2023-02-22 NOTE — PROGRESS NOTES
Daily Note     Today's date: 2023  Patient name: Inge Ramos  : 1950  MRN: [id number]  Referring provider: Cristina Banegas DO  Dx:   Encounter Diagnosis     ICD-10-CM    1  Low back pain with sciatica, sciatica laterality unspecified, unspecified back pain laterality, unspecified chronicity  M54 40                      Subjective: Pt notes he was doing a lot of walking this morning; his back feels about the same      Objective: See treatment diary below      Assessment:  Pt tolerated treatment session fairly well  PT notes discomfort and limited mobility with sacral/lumbar mobility; pt subjectively reports improvements in symptoms with MT  Progressing program to focus on core strength and stability with global movements/dynamic activity  He notes increased discomfort and difficulty with lateral walking lunges  Pt would benefit from continued OP PT services  Plan: Continue per plan of care        Precautions:     Manuals      LE HS, quad, piriformis, hip ABD, and gastroc   10 min  10'     Lumbar PA mobility  3 min  3'     Long axis distraction  2 min  2'     STM                                        Neuro Re-Ed        Stand OAL  10x Bilat  10x bilat     Palloff press  10x5" Hold Bilat Blue  NT     Pball roll knees to chest  10x5" Hold   3 way  15x :05     Lateral walking lunge   10# ball 3 laps             TherEx        Bike  10 min L3  10 min L4     PPT  10x3" Hold  15x :05     LTR  With Tball   10x5" Hold  With Tball   10x5" Hold      Bridges  2x10  SL bridge 2x10 bilat     Clamshells        Lunges  2x10 Each   Bilat F/L  2x10 Each   Bilat F/L      Leg press DL, SL                Instructed HEP & education 10'                       Modalities         MHP/CP PRN  15 min MHP LB Seated  15 min MHP LB Seated

## 2023-02-23 ENCOUNTER — APPOINTMENT (OUTPATIENT)
Dept: PHYSICAL THERAPY | Facility: CLINIC | Age: 73
End: 2023-02-23

## 2023-02-24 ENCOUNTER — OFFICE VISIT (OUTPATIENT)
Dept: PHYSICAL THERAPY | Facility: CLINIC | Age: 73
End: 2023-02-24

## 2023-02-24 DIAGNOSIS — M54.40 LOW BACK PAIN WITH SCIATICA, SCIATICA LATERALITY UNSPECIFIED, UNSPECIFIED BACK PAIN LATERALITY, UNSPECIFIED CHRONICITY: Primary | ICD-10-CM

## 2023-02-24 NOTE — PROGRESS NOTES
Daily Note     Today's date: 2023  Patient name: Aurora Ramos  : 1950  MRN: [id number]  Referring provider: Nirmala Bain DO  Dx:   Encounter Diagnosis     ICD-10-CM    1  Low back pain with sciatica, sciatica laterality unspecified, unspecified back pain laterality, unspecified chronicity  M54 40                      Subjective: Pt notes some improvements but symptoms still present over sacrum      Objective: See treatment diary below      Assessment:  Pt tolerated treatment session fairly well  (-) LLD but continues with some general discomfort and tenderness with palpation over central sacrum  Continuing to progress program to challenge low back stability  PT notes large limitations in R hip IR compared to L side  Plan to continue addressing impairments in order to decrease symptoms  Pt would benefit from continued OP PT services  Plan: Continue per plan of care        Precautions:     Manuals    LE HS, quad, piriformis, hip ABD, and gastroc   10 min  10' 10' 10'   Lumbar PA mobility  3 min  3' 5' c LLD correction 2'   Long axis distraction  2 min  2'  3' lat hip distraction R   STM                                        Neuro Re-Ed        Stand OAL  10x Bilat  10x bilat 10x bilat 10x bilat   Palloff press  10x5" Hold Bilat Blue  NT BOSU squat 2x10 BOSU squat 2x10   Pball roll knees to chest  10x5" Hold   3 way  15x :05 15x :05 15x :05   Lateral walking lunge   10# ball 3 laps 10# ball 3 laps 10# ball 3 laps, fwd 1 lap   BOSU SLR     10x ea bilat           TherEx        Bike  10 min L3  10 min L4 10 min L4 10 min L6   PPT  10x3" Hold  15x :05 15x :05 15x :05   LTR  With Tball   10x5" Hold  With Tball   10x5" Hold  With Tball   10x5" Hold  With Tball   10x5" Hold    Bridges  2x10  SL bridge 2x10 bilat SL bridge 2x10 bilat pball 15x, S/L bridge 2x10 bilat   Clamshells        Lunges  2x10 Each   Bilat F/L  2x10 Each   Bilat F/L  NT    Leg press DL, SL    125# 2x10 135# 2x10, 105# 10x bilat           Instructed HEP & education 10'                       Modalities         MHP/CP PRN  15 min MHP LB Seated  15 min MHP LB Seated  15 min MHP LB Seated  15 min MHP LB Seated 205

## 2023-02-24 NOTE — PROGRESS NOTES
Daily Note     Today's date: 2023  Patient name: Dre Ramos  : 1950  MRN: [id number]  Referring provider: Romie Cotto DO  Dx:   Encounter Diagnosis     ICD-10-CM    1  Low back pain with sciatica, sciatica laterality unspecified, unspecified back pain laterality, unspecified chronicity  M54 40                      Subjective: Pt reports pain in R side low back      Objective: See treatment diary below      Assessment:  Pt tolerated treatment session fairly well  Pt found to have (+) anterior rotation of R side; positive response with MT intervention  He was then able to perform more activity this session with less pain  Pt would benefit from continued OP PT services  Plan: Continue per plan of care        Precautions:     Manuals     LE HS, quad, piriformis, hip ABD, and gastroc   10 min  10' 10'    Lumbar PA mobility  3 min  3' 5' c LLD correction    Long axis distraction  2 min  2'     STM                                        Neuro Re-Ed        Stand OAL  10x Bilat  10x bilat 10x bilat    Palloff press  10x5" Hold Bilat Blue  NT BOSU squat 2x10    Pball roll knees to chest  10x5" Hold   3 way  15x :05 15x :05    Lateral walking lunge   10# ball 3 laps 10# ball 3 laps            TherEx        Bike  10 min L3  10 min L4 10 min L4    PPT  10x3" Hold  15x :05 15x :05    LTR  With Tball   10x5" Hold  With Tball   10x5" Hold  With Tball   10x5" Hold     Bridges  2x10  SL bridge 2x10 bilat SL bridge 2x10 bilat    Clamshells        Lunges  2x10 Each   Bilat F/L  2x10 Each   Bilat F/L  NT    Leg press DL, SL    125# 2x10            Instructed HEP & education 10'                       Modalities         MHP/CP PRN  15 min MHP LB Seated  15 min MHP LB Seated  15 min MHP LB Seated

## 2023-02-27 ENCOUNTER — OFFICE VISIT (OUTPATIENT)
Dept: PHYSICAL THERAPY | Facility: CLINIC | Age: 73
End: 2023-02-27

## 2023-02-27 DIAGNOSIS — M54.40 LOW BACK PAIN WITH SCIATICA, SCIATICA LATERALITY UNSPECIFIED, UNSPECIFIED BACK PAIN LATERALITY, UNSPECIFIED CHRONICITY: Primary | ICD-10-CM

## 2023-02-28 NOTE — PROGRESS NOTES
Daily Note     Today's date: 3/1/2023  Patient name: Jose Ramos  : 1950  MRN: [id number]  Referring provider: Barney Andrade DO  Dx:   Encounter Diagnosis     ICD-10-CM    1  Low back pain with sciatica, sciatica laterality unspecified, unspecified back pain laterality, unspecified chronicity  M54 40                      Subjective:  Patient reports continuation of difficulty with standing activities with development of symptoms with cooking last night  Objective: See treatment diary below      Assessment: Tolerated treatment well  PT notes continuation of decrease ROM and strength t/o the lumbar spine with trunk/core weakness with need for continuation of skilled therapy  Plan: Continue per plan of care        Precautions:     Manuals 2/16 2/22 2/24 2/27 3/1   LE HS, quad, piriformis, hip ABD, and gastroc  10 min  10' 10' 10' 10 min    Lumbar PA mobility 3 min  3' 5' c LLD correction 2' 3 min    Long axis distraction 2 min  2'  3' lat hip distraction R 2 min    STM                                        Neuro Re-Ed        Stand OAL 10x Bilat  10x bilat 10x bilat 10x bilat 15x Bilat    Palloff press 10x5" Hold Bilat Blue  NT BOSU squat 2x10 BOSU squat 2x10 BOSU Squat   2x10    Pball roll knees to chest 10x5" Hold   3 way  15x :05 15x :05 15x :05 15x5" Hold    Lateral walking lunge  10# ball 3 laps 10# ball 3 laps 10# ball 3 laps, fwd 1 lap 10# Ball   2x30 Feet Each    BOSU SLR    10x ea bilat 10x 3 way   Each Bilat            TherEx        Bike 10 min L3  10 min L4 10 min L4 10 min L6 10 min L6    PPT 10x3" Hold  15x :05 15x :05 15x :05 2x10   5" Hold    LTR With Tball   10x5" Hold  With Tball   10x5" Hold  With Tball   10x5" Hold  With Tball   10x5" Hold  With Tball  10x5" Hold    Bridges 2x10  SL bridge 2x10 bilat SL bridge 2x10 bilat pball 15x, S/L bridge 2x10 bilat S/L Bridge   2x10 Bilat    Clamshells     Bridge with Tball   15x5" Hold    Lunges 2x10 Each   Bilat F/L  2x10 Each Bilat F/L  NT     Leg press DL, SL   125# 2x10 135# 2x10, 105# 10x bilat 145# DL  85# SL  2x10 Each            Instructed HEP & education                        Modalities         MHP/CP PRN 15 min MHP LB Seated  15 min MHP LB Seated  15 min MHP LB Seated  15 min MHP LB Seated  15 min MHP LB seated

## 2023-03-01 ENCOUNTER — OFFICE VISIT (OUTPATIENT)
Dept: PHYSICAL THERAPY | Facility: CLINIC | Age: 73
End: 2023-03-01

## 2023-03-01 DIAGNOSIS — M54.40 LOW BACK PAIN WITH SCIATICA, SCIATICA LATERALITY UNSPECIFIED, UNSPECIFIED BACK PAIN LATERALITY, UNSPECIFIED CHRONICITY: Primary | ICD-10-CM

## 2023-03-06 ENCOUNTER — OFFICE VISIT (OUTPATIENT)
Dept: PHYSICAL THERAPY | Facility: CLINIC | Age: 73
End: 2023-03-06

## 2023-03-06 DIAGNOSIS — M54.40 LOW BACK PAIN WITH SCIATICA, SCIATICA LATERALITY UNSPECIFIED, UNSPECIFIED BACK PAIN LATERALITY, UNSPECIFIED CHRONICITY: Primary | ICD-10-CM

## 2023-03-06 NOTE — PROGRESS NOTES
Daily Note     Today's date: 3/6/2023  Patient name: Arnie Ramos  : 1950  MRN: [id number]  Referring provider: Robson Mohamud DO  Dx:   Encounter Diagnosis     ICD-10-CM    1  Low back pain with sciatica, sciatica laterality unspecified, unspecified back pain laterality, unspecified chronicity  M54 40                      Subjective: Pt notes continued pain with extended positions      Objective: See treatment diary below      Assessment:  Pt tolerated treatment session fairly well  Anterior rotation on L side noted; corrected with MT  Repeated flexion does not produce symptoms, immediate radicular symptoms into LLE with stnanding lumbar /  Pt would benefit from continued OP PT services  Plan: Continue per plan of care        Precautions:     Manuals 3/6 2/22 2/24 2/27 3/1   LE HS, quad, piriformis, hip ABD, and gastroc  10' 10' 10' 10' 10 min    Lumbar PA mobility 5' c LLD correction 3' 5' c LLD correction 2' 3 min    Long axis distraction  2'  3' lat hip distraction R 2 min    STM                                        Neuro Re-Ed        Stand OAL  10x bilat 10x bilat 10x bilat 15x Bilat    Palloff press BOSU Squat   2x10 NT BOSU squat 2x10 BOSU squat 2x10 BOSU Squat   2x10    Pball roll knees to chest 15x5" Hold 15x :05 15x :05 15x :05 15x5" Hold    Lateral walking lunge 10# Ball   2x30 Feet Each  10# ball 3 laps 10# ball 3 laps 10# ball 3 laps, fwd 1 lap 10# Ball   2x30 Feet Each    BOSU SLR 10x 3 way   Each Bilat    10x ea bilat 10x 3 way   Each Bilat            TherEx        Bike 10 min L6  10 min L4 10 min L4 10 min L6 10 min L6    PPT 2x10   5" Hold  15x :05 15x :05 15x :05 2x10   5" Hold    LTR With Tball  10x5" Hold  With Tball   10x5" Hold  With Tball   10x5" Hold  With Tball   10x5" Hold  With Tball  10x5" Hold    Bridges S/L Bridge   2x10 Bilat  SL bridge 2x10 bilat SL bridge 2x10 bilat pball 15x, S/L bridge 2x10 bilat S/L Bridge   2x10 Bilat    Clamshells Bridge with Tball   15x5" Hold Bridge with Tball   15x5" Hold    Lunges  2x10 Each   Bilat F/L  NT     Leg press DL, #, 85# 2x10 ea  125# 2x10 135# 2x10, 105# 10x bilat 145# DL  85# SL  2x10 Each            Instructed HEP & education                        Modalities         MHP/CP PRN 15 min MHP LB Seated  15 min MHP LB Seated  15 min MHP LB Seated  15 min MHP LB Seated  15 min MHP LB seated

## 2023-03-07 NOTE — PROGRESS NOTES
PT Re-Evaluation     Today's date: 3/8/2023  Patient name: Aurora Ramos  : 1950  MRN: [id number]  Referring provider: Nirmala Bain DO  Dx:   Encounter Diagnosis     ICD-10-CM    1  Low back pain with sciatica, sciatica laterality unspecified, unspecified back pain laterality, unspecified chronicity  M54 40                      Assessment  Assessment details: PT notes the patient with continuation of decrease ROM and strength t/o the lumbar spine with trunk/core weakness with directional preference of flexion to decrease LE radicular symptoms with need for continuation of skilled therapy for 4 weeks with focus on lumbar stabilization, manual therapy, posture, analgesic modalities, and update/review of HEP  PT will continue with POC when return from extended vacation  PT notes the patient was provided with updated HEP during break from therapy to continue to make strides while away from treatment  Impairments: abnormal or restricted ROM, activity intolerance, impaired balance, impaired physical strength, lacks appropriate home exercise program and pain with function  Understanding of Dx/Px/POC: good   Prognosis: fair    Goals  STG (to be met within 4 weeks):  1  Pt will reports no more than 3/10 pain at worst in order to improve function    2  Pt will improve lumbar ROM to next least restrictive motion in order to improve lumbar mobility  3  Pt will be able to tolerate standing for at least 15 minutes in order to complete basic ADLs  4  Pt will improve lumbar PA mobility to pain free WFL in order to improve posture  5  Pt will be able to ambulate around grocery store without increase in pain in order to improve function  6  Pt will improve FOTO score by at least 10 points in order to improve QOL    LTG (to be met in 8 weeks):  1  Pt will report no more than 0/10 pain at worst in order to complete ADLs  2  Pt will be able to stand for self selected periods of time in order to improve function  3   Pt will be able to ambulate self selected distances in order to meet personal goals  4  Pt will to meet FOTO discharge score in order to improve QOL  5  Pt to be independent with HEP       Plan  Plan details: Transition to [facility]  Patient would benefit from: skilled physical therapy  Planned modality interventions: thermotherapy: hydrocollator packs and cryotherapy  Planned therapy interventions: joint mobilization, manual therapy, neuromuscular re-education, patient education, strengthening, stretching, therapeutic exercise, home exercise program and balance  Frequency: 2x week  Duration in weeks: 4  Treatment plan discussed with: patient        Subjective Evaluation    History of Present Illness  Mechanism of injury: Pt reports the start of low back and alternating leg pain over the past few months  Over the last few weeks his symptoms have gotten more intense, espeically with in his legs  His symptoms are worsened with standing or walking and relieved with sitting  He is currently not taking any rx medications for his back pain; currently on statins  Presently the patient has attended 8 sessions of skilled therapy and feels overall varying degrees of status  Patient reports feeling stronger in the trunk and hips but slight worsening of LE symptoms with limitations with standing, walking and recreation  Patient reports he is going on vacation for several weeks and verbalizes understanding of updated [facility]  Patient states he will contact clinic when he returns to the area to re-start treatment      Pain  Current pain ratin  At best pain ratin  At worst pain ratin  Location: L side low back, posterior L leg  Aggravating factors: standing, walking and stair climbing  Progression: improved    Treatments  Current treatment: medication and physical therapy  Patient Goals  Patient goals for therapy: increased strength, independence with ADLs/IADLs, return to sport/leisure activities, increased motion, improved balance and decreased pain          Objective     Concurrent Complaints  Negative for night pain, disturbed sleep, bladder dysfunction, bowel dysfunction and saddle (S4) numbness    Tenderness     Lumbar Spine  Tenderness in the spinous process and facet joint  Left Hip   Tenderness in the PSIS  Right Hip   No tenderness in the PSIS  Neurological Testing     Sensation     Lumbar   Left   Intact: light touch    Right   Intact: light touch    Reflexes   Left   Patellar (L4): trace (1+)  Achilles (S1): trace (1+)    Right   Patellar (L4): trace (1+)  Achilles (S1): trace (1+)    Active Range of Motion     Lumbar   Flexion:  with pain Restriction level: minimal  Extension:  with pain Restriction level: moderate  Left lateral flexion:  Restriction level: minimal  Right lateral flexion:  Restriction level: moderate  Left rotation:  WF  Right rotation:  [facility]  Mechanical Assessment    Cervical      Thoracic      Lumbar    Standing flexion: repeated movements   Pain location:no change  Standing extension: repeated movements  Pain location: peripheralized  Pain intensity: worse    Strength/Myotome Testing     Lumbar   Left   Normal strength    Right   Normal strength    Tests     Lumbar     Left   Positive tibial bias  Negative passive SLR and sural bias  Right   Positive sural bias and tibial bias  Negative crossed SLR  Left Knee   Negative peroneal nerve tension  Right Knee   Negative peroneal nerve tension                Precautions:     Manuals 3/6 3/8   3/1   LE HS, quad, piriformis, hip ABD, and gastroc  10' 10 min    10 min    Lumbar PA mobility 5' c LLD correction 3 min    3 min    Long axis distraction  2 min    2 min    STM                                        Neuro Re-Ed        Stand OAL     15x Bilat    Palloff press BOSU Squat   2x10    BOSU Squat   2x10    Pball roll knees to chest 15x5" Hold    15x5" Hold    Lateral walking lunge 10# Ball   2x30 Feet Each     10# Adriano Moment 2x30 Feet Each    BOSU SLR 10x 3 way   Each Bilat     10x 3 way   Each Bilat            TherEx        Bike 10 min L6  10 min L6    10 min L6    PPT 2x10   5" Hold     2x10   5" Hold    LTR With Tball  10x5" Hold     With Tball  10x5" Hold    Bridges S/L Bridge   2x10 Bilat     S/L Bridge   2x10 Bilat    Clamshells Bridge with Tball   15x5" Hold     Bridge with Tball   15x5" Hold    Lunges        Leg press DL, #, 85# 2x10 ea    145# DL  85# SL  2x10 Each            Instructed HEP & education  30 min                       Modalities         MHP/CP PRN 15 min MHP LB Seated  15 min Seated   15 min MHP LB seated

## 2023-03-08 ENCOUNTER — EVALUATION (OUTPATIENT)
Dept: PHYSICAL THERAPY | Facility: CLINIC | Age: 73
End: 2023-03-08

## 2023-03-08 DIAGNOSIS — M54.40 LOW BACK PAIN WITH SCIATICA, SCIATICA LATERALITY UNSPECIFIED, UNSPECIFIED BACK PAIN LATERALITY, UNSPECIFIED CHRONICITY: Primary | ICD-10-CM

## 2023-03-08 NOTE — LETTER
2023    DO Joselo Cormier [address]  [address] [address] Patrick Belcher    Patient: Lisa Ramos   YOB: 1950   Date of Visit: 3/8/2023     Encounter Diagnosis     ICD-10-CM    1  Low back pain with sciatica, sciatica laterality unspecified, unspecified back pain laterality, unspecified chronicity  M54 40           Dear Dr Melia Pollack: Thank you for your recent referral of Lisa Ramos  Please review the attached re-evaluation summary from Bryce's recent visit  Please verify that you agree with the plan of care by signing the attached order  If you have any questions or concerns, please do not hesitate to call  I sincerely appreciate the opportunity to share in the care of one of your patients and hope to have another opportunity to work with you in the near future  Sincerely,    Sandra Whittaker, PT      Referring Provider:      I certify that I have read the below Plan of Care and certify the need for these services furnished under this plan of treatment while under my care  DO Milan CormierLee's Summit Hospitalmaribel [facility]  [address] [place] [address]  Via Fax: [phone]          PT Re-Evaluation     Today's date: 3/8/2023  Patient name: Lisa Ramos  : 1950  MRN: [id number]  Referring provider: Herrera Frank DO  Dx:   Encounter Diagnosis     ICD-10-CM    1  Low back pain with sciatica, sciatica laterality unspecified, unspecified back pain laterality, unspecified chronicity  M54 40                      Assessment  Assessment details: PT notes the patient with continuation of decrease ROM and strength t/o the lumbar spine with trunk/core weakness with directional preference of flexion to decrease LE radicular symptoms with need for continuation of skilled therapy for 4 weeks with focus on lumbar stabilization, manual therapy, posture, analgesic modalities, and update/review of HEP    PT will continue with POC when return from extended vacation  PT notes the patient was provided with updated HEP during break from therapy to continue to make strides while away from treatment  Impairments: abnormal or restricted ROM, activity intolerance, impaired balance, impaired physical strength, lacks appropriate home exercise program and pain with function  Understanding of Dx/Px/POC: good   Prognosis: fair    Goals  STG (to be met within 4 weeks):  1  Pt will reports no more than 3/10 pain at worst in order to improve function    2  Pt will improve lumbar ROM to next least restrictive motion in order to improve lumbar mobility  3  Pt will be able to tolerate standing for at least 15 minutes in order to complete basic ADLs  4  Pt will improve lumbar PA mobility to pain free WFL in order to improve posture  5  Pt will be able to ambulate around grocery store without increase in pain in order to improve function  6  Pt will improve FOTO score by at least 10 points in order to improve QOL    LTG (to be met in 8 weeks):  1  Pt will report no more than 0/10 pain at worst in order to complete ADLs  2  Pt will be able to stand for self selected periods of time in order to improve function  3  Pt will be able to ambulate self selected distances in order to meet personal goals  4  Pt will to meet FOTO discharge score in order to improve QOL  5  Pt to be independent with HEP       Plan  Plan details: Transition to HEP     Patient would benefit from: skilled physical therapy  Planned modality interventions: thermotherapy: hydrocollator packs and cryotherapy  Planned therapy interventions: joint mobilization, manual therapy, neuromuscular re-education, patient education, strengthening, stretching, therapeutic exercise, home exercise program and balance  Frequency: 2x week  Duration in weeks: 4  Treatment plan discussed with: patient        Subjective Evaluation    History of Present Illness  Mechanism of injury: Pt reports the start of low back and alternating leg pain over the past few months  Over the last few weeks his symptoms have gotten more intense, espeically with in his legs  His symptoms are worsened with standing or walking and relieved with sitting  He is currently not taking any rx medications for his back pain; currently on statins  Presently the patient has attended 8 sessions of skilled therapy and feels overall varying degrees of status  Patient reports feeling stronger in the trunk and hips but slight worsening of LE symptoms with limitations with standing, walking and recreation  Patient reports he is going on vacation for several weeks and verbalizes understanding of updated HEP  Patient states he will contact clinic when he returns to the area to re-start treatment  Pain  Current pain ratin  At best pain ratin  At worst pain ratin  Location: L side low back, posterior L leg  Aggravating factors: standing, walking and stair climbing  Progression: improved    Treatments  Current treatment: medication and physical therapy  Patient Goals  Patient goals for therapy: increased strength, independence with ADLs/IADLs, return to sport/leisure activities, increased motion, improved balance and decreased pain          Objective     Concurrent Complaints  Negative for night pain, disturbed sleep, bladder dysfunction, bowel dysfunction and saddle (S4) numbness    Tenderness     Lumbar Spine  Tenderness in the spinous process and facet joint  Left Hip   Tenderness in the PSIS  Right Hip   No tenderness in the PSIS       Neurological Testing     Sensation     Lumbar   Left   Intact: light touch    Right   Intact: light touch    Reflexes   Left   Patellar (L4): trace (1+)  Achilles (S1): trace (1+)    Right   Patellar (L4): trace (1+)  Achilles (S1): trace (1+)    Active Range of Motion     Lumbar   Flexion:  with pain Restriction level: minimal  Extension:  with pain Restriction level: moderate  Left lateral flexion: Restriction level: minimal  Right lateral flexion:  Restriction level: moderate  Left rotation:  WFL  Right rotation:  [facility]  Mechanical Assessment    Cervical      Thoracic      Lumbar    Standing flexion: repeated movements   Pain location:no change  Standing extension: repeated movements  Pain location: peripheralized  Pain intensity: worse    Strength/Myotome Testing     Lumbar   Left   Normal strength    Right   Normal strength    Tests     Lumbar     Left   Positive tibial bias  Negative passive SLR and sural bias  Right   Positive sural bias and tibial bias  Negative crossed SLR  Left Knee   Negative peroneal nerve tension  Right Knee   Negative peroneal nerve tension               Precautions:     Manuals 3/6 3/8   3/1   LE HS, quad, piriformis, hip ABD, and gastroc  10' 10 min    10 min    Lumbar PA mobility 5' c LLD correction 3 min    3 min    Long axis distraction  2 min    2 min    STM                                        Neuro Re-Ed        Stand OAL     15x Bilat    Palloff press BOSU Squat   2x10    BOSU Squat   2x10    Pball roll knees to chest 15x5" Hold    15x5" Hold    Lateral walking lunge 10# Ball   2x30 Feet Each     10# Ball   2x30 Feet Each    BOSU SLR 10x 3 way   Each Bilat     10x 3 way   Each Bilat            TherEx        Bike 10 min L6  10 min L6    10 min L6    PPT 2x10   5" Hold     2x10   5" Hold    LTR With Tball  10x5" Hold     With Tball  10x5" Hold    Bridges S/L Bridge   2x10 Bilat     S/L Bridge   2x10 Bilat    Clamshells Bridge with Tball   15x5" Hold     Bridge with Tball   15x5" Hold    Lunges        Leg press DL, #, 85# 2x10 ea    145# DL  85# SL  2x10 Each            Instructed HEP & education  30 min                       Modalities         MHP/CP PRN 15 min MHP LB Seated  15 min Seated   15 min MHP LB seated

## 2024-07-08 ENCOUNTER — TELEPHONE (OUTPATIENT)
Dept: UROLOGY | Facility: CLINIC | Age: 74
End: 2024-07-08

## 2024-07-11 ENCOUNTER — OFFICE VISIT (OUTPATIENT)
Dept: UROLOGY | Facility: CLINIC | Age: 74
End: 2024-07-11
Payer: MEDICARE

## 2024-07-11 VITALS
HEART RATE: 61 BPM | WEIGHT: 171.2 LBS | TEMPERATURE: 98 F | OXYGEN SATURATION: 99 % | SYSTOLIC BLOOD PRESSURE: 110 MMHG | BODY MASS INDEX: 27.51 KG/M2 | DIASTOLIC BLOOD PRESSURE: 68 MMHG | HEIGHT: 66 IN

## 2024-07-11 DIAGNOSIS — N40.0 BENIGN PROSTATIC HYPERPLASIA, UNSPECIFIED WHETHER LOWER URINARY TRACT SYMPTOMS PRESENT: Primary | ICD-10-CM

## 2024-07-11 LAB
SL AMB  POCT GLUCOSE, UA: ABNORMAL
SL AMB LEUKOCYTE ESTERASE,UA: ABNORMAL
SL AMB POCT BILIRUBIN,UA: ABNORMAL
SL AMB POCT BLOOD,UA: ABNORMAL
SL AMB POCT CLARITY,UA: CLEAR
SL AMB POCT COLOR,UA: ABNORMAL
SL AMB POCT KETONES,UA: ABNORMAL
SL AMB POCT NITRITE,UA: ABNORMAL
SL AMB POCT PH,UA: 5.5
SL AMB POCT SPECIFIC GRAVITY,UA: 1.03
SL AMB POCT URINE PROTEIN: ABNORMAL
SL AMB POCT UROBILINOGEN: 1

## 2024-07-11 PROCEDURE — 81003 URINALYSIS AUTO W/O SCOPE: CPT | Performed by: UROLOGY

## 2024-07-11 PROCEDURE — 99203 OFFICE O/P NEW LOW 30 MIN: CPT | Performed by: UROLOGY

## 2024-07-11 RX ORDER — OMEGA-3S/DHA/EPA/FISH OIL/D3 300MG-1000
400 CAPSULE ORAL DAILY
COMMUNITY

## 2024-07-11 NOTE — PROGRESS NOTES
"UROLOGY PROGRESS NOTE         NAME: Bryce Ramos  AGE: 73 y.o. SEX: male  : 1950   MRN: [id number]    DATE: 2024  TIME: 1:27 PM    Assessment and Plan      Impression:   1. Benign prostatic hyperplasia, unspecified whether lower urinary tract symptoms present  -     Ambulatory Referral to Urology    Status post TURP.  PSA recently 2.5 slowly rising.   Plan: Follow-up 1 year with a PSA.  Sooner if any troubles.      Chief Complaint     Chief Complaint   Patient presents with    New pt    drips urine after voiding over last 8 months     History of Present Illness     HPI: Bryce Ramos is a 73 y.o. year old male who presents with status post what sounds like TURP by Dr. Abreu roughly 10 years ago.  He was in urinary retention prior to that.  Retention resolved.  Patient basically asymptomatic at this point.  He has some mild postvoid dribbling.  Nocturia 0-1 time per night good flow no urgency no frequency during the day.  No hematuria no dysuria.  No erectile dysfunction.  PSA 2.5.  Slowly rising over the last couple of years.  Status post vasectomy many years ago.  Physically active.              The following portions of the patient's history were reviewed and updated as appropriate: allergies, current medications, past family history, past medical history, past social history, past surgical history and problem list.  Past Medical History:   Diagnosis Date    BPH with obstruction/lower urinary tract symptoms     Hypercholesteremia     Low back pain with sciatica      Past Surgical History:   Procedure Laterality Date    VASECTOMY  1985     shoulder  Review of Systems     Const: Denies chills, fever and weight loss.  CV: Denies chest pain.  Resp: Denies SOB.  GI: Denies abdominal pain, nausea and vomiting.  : Denies symptoms other than stated above.  Musculo: Denies back pain.    Objective   /68   Pulse 61   Temp 98 °F (36.7 °C)   Ht 5' 6\" (1.676 m)   Wt 77.7 kg (171 lb 3.2 oz)  "  SpO2 99%   BMI 27.63 kg/m²     Physical Exam  Const: Appears healthy and well developed. No signs of acute distress present.  Resp: Respirations are regular and unlabored.   CV: Rate is regular. Rhythm is regular.  Abdomen: Abdomen is soft, nontender, and nondistended. Kidneys are not palpable.  : Penis testicles normal mild fullness left epididymis.  No hernias.  No SP tenderness.  Prostate 1+ benign.  Psych: Patient's attitude is cooperative. Mood is normal. Affect is normal.    Current Medications     Current Outpatient Medications:     atorvastatin (LIPITOR) 10 mg tablet, , Disp: , Rfl:     cholecalciferol (VITAMIN D3) 400 units tablet, Take 400 Units by mouth daily, Disp: , Rfl:         Frank D'Amico, MD

## 2025-01-11 ENCOUNTER — ANESTHESIA (OUTPATIENT)
Dept: ANESTHESIOLOGY | Facility: HOSPITAL | Age: 75
End: 2025-01-11

## 2025-01-11 ENCOUNTER — ANESTHESIA EVENT (OUTPATIENT)
Dept: ANESTHESIOLOGY | Facility: HOSPITAL | Age: 75
End: 2025-01-11

## 2025-01-11 NOTE — ANESTHESIA PREPROCEDURE EVALUATION
"Procedure:  PRE-OP ONLY    Relevant Problems   No relevant active problems      Lab Results   Component Value Date    WBC 6.92 02/26/2020    HGB 14.2 02/26/2020    HCT 43.7 02/26/2020    MCV 90 02/26/2020     02/26/2020     Lab Results   Component Value Date    SODIUM 139 11/20/2023    K 4.3 11/20/2023     11/20/2023    CO2 27 11/20/2023    BUN 23 11/20/2023    CREATININE 1.10 11/20/2023    GLUC 100 (H) 11/20/2023    CALCIUM 9.2 11/20/2023     No results found for: \"INR\", \"PROTIME\"  Lab Results   Component Value Date    HGBA1C 5.2 02/26/2020               Anesthesia Plan  ASA Score- 2     Anesthesia Type- IV sedation with anesthesia with ASA Monitors.         Additional Monitors:     Airway Plan:            Plan Factors-    Chart reviewed.    Patient summary reviewed.                  Induction- intravenous.    Postoperative Plan-         Informed Consent-         "

## 2025-01-13 ENCOUNTER — ANESTHESIA EVENT (OUTPATIENT)
Dept: GASTROENTEROLOGY | Facility: HOSPITAL | Age: 75
End: 2025-01-13
Payer: MEDICARE

## 2025-01-13 ENCOUNTER — HOSPITAL ENCOUNTER (OUTPATIENT)
Dept: GASTROENTEROLOGY | Facility: HOSPITAL | Age: 75
Setting detail: OUTPATIENT SURGERY
Discharge: HOME | End: 2025-01-13
Attending: HOSPITALIST | Admitting: HOSPITALIST
Payer: MEDICARE

## 2025-01-13 ENCOUNTER — ANESTHESIA (OUTPATIENT)
Dept: GASTROENTEROLOGY | Facility: HOSPITAL | Age: 75
End: 2025-01-13
Payer: MEDICARE

## 2025-01-13 VITALS
TEMPERATURE: 97.6 F | HEIGHT: 63 IN | WEIGHT: 171 LBS | HEART RATE: 59 BPM | RESPIRATION RATE: 20 BRPM | BODY MASS INDEX: 30.3 KG/M2 | SYSTOLIC BLOOD PRESSURE: 121 MMHG | DIASTOLIC BLOOD PRESSURE: 68 MMHG | OXYGEN SATURATION: 95 %

## 2025-01-13 DIAGNOSIS — Z12.11 ENCOUNTER FOR SCREENING FOR MALIGNANT NEOPLASM OF COLON: ICD-10-CM

## 2025-01-13 RX ORDER — SODIUM CHLORIDE, SODIUM LACTATE, POTASSIUM CHLORIDE, CALCIUM CHLORIDE 600; 310; 30; 20 MG/100ML; MG/100ML; MG/100ML; MG/100ML
INJECTION, SOLUTION INTRAVENOUS CONTINUOUS PRN
Status: DISCONTINUED | OUTPATIENT
Start: 2025-01-13 | End: 2025-01-13

## 2025-01-13 RX ORDER — LIDOCAINE HYDROCHLORIDE 10 MG/ML
INJECTION, SOLUTION EPIDURAL; INFILTRATION; INTRACAUDAL; PERINEURAL AS NEEDED
Status: DISCONTINUED | OUTPATIENT
Start: 2025-01-13 | End: 2025-01-13

## 2025-01-13 RX ORDER — PROPOFOL 10 MG/ML
INJECTION, EMULSION INTRAVENOUS AS NEEDED
Status: DISCONTINUED | OUTPATIENT
Start: 2025-01-13 | End: 2025-01-13

## 2025-01-13 RX ADMIN — LIDOCAINE HYDROCHLORIDE 50 MG: 10 INJECTION, SOLUTION EPIDURAL; INFILTRATION; INTRACAUDAL; PERINEURAL at 09:05

## 2025-01-13 RX ADMIN — SODIUM CHLORIDE, SODIUM LACTATE, POTASSIUM CHLORIDE, AND CALCIUM CHLORIDE: .6; .31; .03; .02 INJECTION, SOLUTION INTRAVENOUS at 09:02

## 2025-01-13 RX ADMIN — PROPOFOL 150 MCG/KG/MIN: 10 INJECTION, EMULSION INTRAVENOUS at 09:06

## 2025-01-13 RX ADMIN — PROPOFOL 150 MG: 10 INJECTION, EMULSION INTRAVENOUS at 09:05

## 2025-01-13 NOTE — H&P
Procedure(s):  Colonoscopy with indication(s) of   CRC screening      Vitals:    01/13/25 0830   BP: 129/89   Pulse: 58   Resp: 18   Temp: 97.5 °F (36.4 °C)   SpO2: 97%       Physical Exam:  Physical Exam  HENT:      Nose: Nose normal.   Eyes:      Conjunctiva/sclera: Conjunctivae normal.   Cardiovascular:      Rate and Rhythm: Normal rate.   Pulmonary:      Effort: Pulmonary effort is normal.   Abdominal:      Palpations: Abdomen is soft.   Neurological:      General: No focal deficit present.      Mental Status: He is alert.   Psychiatric:         Mood and Affect: Mood normal.              Endoscopy Pre-Procedure Assessment:  Prior to the procedure, the patient is identified.  The patient's history, medications, and allergies have been reviewed.  The patient is competent.     Consent:    We have discussed the procedure in detail. We reviewed risks, benefits and alternative as well as potentional complications including and not limited to missed lesion or polyp,medication side effect , infection, bleeding, perforation and the potential need for surgery, ICU admission, CPR, as well as the need for blood product transfusion. Patient verbalized understanding and agreement. All patient questions were answered.     After reviewed the risks and benefits, the patient is deemed in satisfactory condition to undergo the procedure.  The anesthesia plan is to use monitored anesthesia care (MAC).      01/13/25

## 2025-01-13 NOTE — ANESTHESIA POSTPROCEDURE EVALUATION
Post-Op Assessment Note    CV Status:  Stable    Pain management: adequate       Mental Status:  Sleepy   Hydration Status:  Euvolemic   PONV Controlled:  Controlled   Airway Patency:  Patent  Two or more mitigation strategies used for obstructive sleep apnea   Post Op Vitals Reviewed: Yes    No anethesia notable event occurred.    Staff: CRNA           Last Filed PACU Vitals:  Vitals Value Taken Time   Temp     Pulse 73 01/13/25 0922   /67 01/13/25 0920   Resp 21 01/13/25 0922   SpO2 91 % 01/13/25 0922   Vitals shown include unfiled device data.

## 2025-01-13 NOTE — ANESTHESIA PREPROCEDURE EVALUATION
"Procedure:  COLONOSCOPY    Relevant Problems   ANESTHESIA (within normal limits)      CARDIO   (+) Hypercholesteremia      ENDO (within normal limits)      GI/HEPATIC (within normal limits)      /RENAL   (+) BPH with obstruction/lower urinary tract symptoms      HEMATOLOGY (within normal limits)      MUSCULOSKELETAL   (+) Low back pain with sciatica      NEURO/PSYCH (within normal limits)      PULMONARY (within normal limits)      Lab Results   Component Value Date    WBC 6.92 02/26/2020    HGB 14.2 02/26/2020    HCT 43.7 02/26/2020    MCV 90 02/26/2020     02/26/2020     Lab Results   Component Value Date    SODIUM 139 11/20/2023    K 4.3 11/20/2023     11/20/2023    CO2 27 11/20/2023    BUN 23 11/20/2023    CREATININE 1.10 11/20/2023    GLUC 100 (H) 11/20/2023    CALCIUM 9.2 11/20/2023     No results found for: \"INR\", \"PROTIME\"  Lab Results   Component Value Date    HGBA1C 5.2 02/26/2020          Physical Exam    Airway    Mallampati score: I  TM Distance: >3 FB  Neck ROM: full     Dental   No notable dental hx     Cardiovascular  Cardiovascular exam normal    Pulmonary  Pulmonary exam normal     Other Findings        Anesthesia Plan  ASA Score- 2     Anesthesia Type- IV sedation with anesthesia with ASA Monitors.         Additional Monitors:     Airway Plan:            Plan Factors-Exercise tolerance (METS): >4 METS.    Chart reviewed.    Patient summary reviewed.                  Induction- intravenous.    Postoperative Plan-         Informed Consent- Anesthetic plan and risks discussed with patient.  I personally reviewed this patient with the CRNA. Discussed and agreed on the Anesthesia Plan with the CRNA..        "